# Patient Record
Sex: FEMALE | Race: AMERICAN INDIAN OR ALASKA NATIVE
[De-identification: names, ages, dates, MRNs, and addresses within clinical notes are randomized per-mention and may not be internally consistent; named-entity substitution may affect disease eponyms.]

---

## 2017-01-09 ENCOUNTER — HOSPITAL ENCOUNTER (OUTPATIENT)
Dept: HOSPITAL 5 - SPVWC | Age: 67
Discharge: HOME | End: 2017-01-09
Attending: OBSTETRICS & GYNECOLOGY
Payer: COMMERCIAL

## 2017-01-09 DIAGNOSIS — Z12.31: Primary | ICD-10-CM

## 2017-01-09 PROCEDURE — 77067 SCR MAMMO BI INCL CAD: CPT

## 2017-01-09 PROCEDURE — G0202 SCR MAMMO BI INCL CAD: HCPCS

## 2017-01-09 NOTE — MAMMOGRAPHY REPORT
BILATERAL DIGITAL SCREENING MAMMOGRAM : 01/09/17 08:56:00



CLINICAL: Routine screening.



COMPARISON:01/08/16



FINDINGS: The breasts are heterogeneously dense, which may obscure 

small masses.No mass, architectural distortion or suspicious 

calcifications.



IMPRESSION: No mammographic evidence of malignancy.



BI-RADS CATEGORY:  1 -- Negative



RECOMMENDATION: Routine mammographic screening in one year.





COMMENT:

Patient follow-up letters are generated by our [employer] application.

## 2017-06-20 ENCOUNTER — HOSPITAL ENCOUNTER (OUTPATIENT)
Dept: HOSPITAL 5 - SPVWC | Age: 67
Discharge: HOME | End: 2017-06-20
Attending: OBSTETRICS & GYNECOLOGY
Payer: COMMERCIAL

## 2017-06-20 DIAGNOSIS — N60.02: Primary | ICD-10-CM

## 2017-06-20 DIAGNOSIS — N64.4: ICD-10-CM

## 2017-06-20 PROCEDURE — G0204 DX MAMMO INCL CAD BI: HCPCS

## 2017-06-20 PROCEDURE — 76641 ULTRASOUND BREAST COMPLETE: CPT

## 2017-06-20 PROCEDURE — 77066 DX MAMMO INCL CAD BI: CPT

## 2017-06-20 NOTE — MAMMOGRAPHY REPORT
BILATERAL DIGITAL DIAGNOSTIC MAMMOGRAM with CAD and BILATERAL BREAST 

ULTRASOUND: 06/20/17 



CLINICAL: Bilateral breast pain.



COMPARISON:01/09/17



FINDINGS: The breasts are heterogeneously dense, which may obscure 

small masses.No mass, architectural distortion or suspicious 

calcifications. 



Ultrasound of the right breast (including all four quadrants and the 

retroareolar area)  demonstrated normal fibroglandular and fatty 

structures.  No mass, cyst or shadowing.



Ultrasound of the left breast (including all four quadrants and the 

retroareolar area)  demonstrated a single benign nontender cyst at 10 

o'clock 3 cm from the nipple measuring 3 x 3 x 2 mm.  No solid mass or 

shadowing.  Ultrasound of the left axilla demonstrated benign lymph 

nodes measuring 1.6 x 0.4 x 0.7 cm and 1.9 x 0.8 x 0.7 cm.



IMPRESSION: 1. Negative bilateral mammogram.  2.  Negative right breast 

ultrasound.  3.  A single 3 mm left breast cyst.  4. No explanation for 

bilateral breast pain.



BI-RADS CATEGORY:   2 -- Benign



RECOMMENDATION: Clinical followup and routine mammographic screening in 

one year.





COMMENT:

Patient follow-up letters are generated by our [employer] application.

## 2018-08-13 ENCOUNTER — HOSPITAL ENCOUNTER (OUTPATIENT)
Dept: HOSPITAL 5 - SPVWC | Age: 68
Discharge: HOME | End: 2018-08-13
Attending: OBSTETRICS & GYNECOLOGY
Payer: COMMERCIAL

## 2018-08-13 DIAGNOSIS — Z12.31: Primary | ICD-10-CM

## 2018-08-13 PROCEDURE — 77067 SCR MAMMO BI INCL CAD: CPT

## 2018-08-13 NOTE — MAMMOGRAPHY REPORT
BILATERAL DIGITAL SCREENING MAMMOGRAM with CAD: 08/13/18 10:01:00



CLINICAL: Routine screening.



COMPARISON:01/09/17



FINDINGS: The breasts are heterogeneously dense, which may obscure 

small masses. No mass, architectural distortion or suspicious 

calcifications.



IMPRESSION: No mammographic evidence of malignancy.



BI-RADS CATEGORY: 1 - - Negative



RECOMMENDATION: Routine mammographic screening in one year.





COMMENT:

Patient follow-up letters are generated by our [employer] application.

## 2021-05-13 ENCOUNTER — LAB OUTSIDE AN ENCOUNTER (OUTPATIENT)
Dept: URBAN - METROPOLITAN AREA CLINIC 118 | Facility: CLINIC | Age: 71
End: 2021-05-13

## 2021-05-13 ENCOUNTER — OFFICE VISIT (OUTPATIENT)
Dept: URBAN - METROPOLITAN AREA CLINIC 118 | Facility: CLINIC | Age: 71
End: 2021-05-13
Payer: COMMERCIAL

## 2021-05-13 VITALS
SYSTOLIC BLOOD PRESSURE: 153 MMHG | TEMPERATURE: 97.7 F | DIASTOLIC BLOOD PRESSURE: 85 MMHG | BODY MASS INDEX: 35.26 KG/M2 | HEART RATE: 88 BPM | WEIGHT: 179.6 LBS | HEIGHT: 60 IN

## 2021-05-13 DIAGNOSIS — K64.4 EXTERNAL HEMORRHOID: ICD-10-CM

## 2021-05-13 DIAGNOSIS — K59.09 CHRONIC CONSTIPATION: ICD-10-CM

## 2021-05-13 DIAGNOSIS — K62.5 RECTAL BLEEDING: ICD-10-CM

## 2021-05-13 DIAGNOSIS — Z86.010 PERSONAL HISTORY OF COLONIC POLYPS: ICD-10-CM

## 2021-05-13 PROBLEM — 236069009: Status: ACTIVE | Noted: 2021-05-13

## 2021-05-13 PROBLEM — 23913003: Status: ACTIVE | Noted: 2021-05-13

## 2021-05-13 PROBLEM — 305058001: Status: ACTIVE | Noted: 2021-05-13

## 2021-05-13 PROCEDURE — 99204 OFFICE O/P NEW MOD 45 MIN: CPT | Performed by: INTERNAL MEDICINE

## 2021-05-13 RX ORDER — AMLODIPINE BESYLATE 5 MG/1
1 TABLET TABLET ORAL ONCE A DAY
Status: ACTIVE | COMMUNITY

## 2021-05-13 RX ORDER — LOSARTAN POTASSIUM 50 MG/1
TABLET ORAL
Qty: 0 | Refills: 0 | Status: ACTIVE | COMMUNITY
Start: 2017-08-29

## 2021-05-13 NOTE — HPI-TODAY'S VISIT:
72 yo F presents for rectal bleeding and screening colonoscopy. Last colonoscopy 8/2020 with very poor prep, recommended to repeat this year. Pt will bring records. She reports onset of blood last month. 1 episode of red blood on toilet paper only. BMs varying b/w QD or 2x/wk w/occ hard stool, no straining. She reports bloating, lower abd pain that is relieved with BMs. This is her regular pattern. Denies N/V, fever, wt loss, diarrhea, changes in bowel habits. Personal hx of colon polyps. No FH of colon cancer.

## 2021-05-15 PROBLEM — 12063002: Status: ACTIVE | Noted: 2021-05-13

## 2021-06-21 ENCOUNTER — CLAIMS CREATED FROM THE CLAIM WINDOW (OUTPATIENT)
Dept: URBAN - METROPOLITAN AREA CLINIC 4 | Facility: CLINIC | Age: 71
End: 2021-06-21
Payer: COMMERCIAL

## 2021-06-21 ENCOUNTER — OFFICE VISIT (OUTPATIENT)
Dept: URBAN - METROPOLITAN AREA SURGERY CENTER 23 | Facility: SURGERY CENTER | Age: 71
End: 2021-06-21
Payer: COMMERCIAL

## 2021-06-21 DIAGNOSIS — D12.3 BENIGN NEOPLASM OF TRANSVERSE COLON: ICD-10-CM

## 2021-06-21 DIAGNOSIS — Z86.010 H/O ADENOMATOUS POLYP OF COLON: ICD-10-CM

## 2021-06-21 DIAGNOSIS — D12.3 ADENOMA OF TRANSVERSE COLON: ICD-10-CM

## 2021-06-21 PROCEDURE — 88305 TISSUE EXAM BY PATHOLOGIST: CPT | Performed by: PATHOLOGY

## 2021-06-21 PROCEDURE — G8907 PT DOC NO EVENTS ON DISCHARG: HCPCS | Performed by: INTERNAL MEDICINE

## 2021-06-21 PROCEDURE — 45385 COLONOSCOPY W/LESION REMOVAL: CPT | Performed by: INTERNAL MEDICINE

## 2021-06-21 RX ORDER — AMLODIPINE BESYLATE 5 MG/1
1 TABLET TABLET ORAL ONCE A DAY
Status: ACTIVE | COMMUNITY

## 2021-06-21 RX ORDER — LOSARTAN POTASSIUM 50 MG/1
TABLET ORAL
Qty: 0 | Refills: 0 | Status: ACTIVE | COMMUNITY
Start: 2017-08-29

## 2021-08-24 ENCOUNTER — OFFICE VISIT (OUTPATIENT)
Dept: URBAN - METROPOLITAN AREA CLINIC 118 | Facility: CLINIC | Age: 71
End: 2021-08-24

## 2021-11-02 ENCOUNTER — OFFICE VISIT (OUTPATIENT)
Dept: URBAN - METROPOLITAN AREA CLINIC 118 | Facility: CLINIC | Age: 71
End: 2021-11-02

## 2022-05-27 ENCOUNTER — OFFICE VISIT (OUTPATIENT)
Dept: URBAN - METROPOLITAN AREA CLINIC 118 | Facility: CLINIC | Age: 72
End: 2022-05-27
Payer: COMMERCIAL

## 2022-05-27 ENCOUNTER — DASHBOARD ENCOUNTERS (OUTPATIENT)
Age: 72
End: 2022-05-27

## 2022-05-27 DIAGNOSIS — R93.5 ABNORMAL CT OF THE ABDOMEN: ICD-10-CM

## 2022-05-27 DIAGNOSIS — Z86.010 PERSONAL HISTORY OF COLONIC POLYPS: ICD-10-CM

## 2022-05-27 PROBLEM — 428283002: Status: ACTIVE | Noted: 2021-05-13

## 2022-05-27 PROBLEM — 15634181000119107: Status: ACTIVE | Noted: 2022-05-27

## 2022-05-27 PROCEDURE — 99213 OFFICE O/P EST LOW 20 MIN: CPT | Performed by: INTERNAL MEDICINE

## 2022-05-27 RX ORDER — AMLODIPINE BESYLATE 5 MG/1
1 TABLET TABLET ORAL ONCE A DAY
Status: ACTIVE | COMMUNITY

## 2022-05-27 RX ORDER — LOSARTAN POTASSIUM 50 MG/1
TABLET ORAL
Qty: 0 | Refills: 0 | Status: ACTIVE | COMMUNITY
Start: 2017-08-29

## 2022-05-27 NOTE — HPI-TODAY'S VISIT:
73 yo BF here for evaluation of gallstones.  Pt apparently went for a [facility] screening with CT, and was then sent to PCP.  PCP, then at [facility], then did another CT, and was told she had "gallstones on your liver." No abd pain, N/V, weight loss.  No F/C/NS.   No hx of liver disease.

## 2023-02-10 NOTE — ULTRASOUND REPORT
ECG 12 Lead    Date/Time: 2/10/2023 10:36 AM  Performed by: Frank Madison MD  Authorized by: Frank Madison MD   Comparison: not compared with previous ECG   Rhythm: sinus bradycardia  Rate: bradycardic  Conduction: conduction normal  ST Depression: II, III and aVF  T Waves: T waves normal  QRS axis: normal  Other: no other findings    Clinical impression: abnormal EKG  Comments: EKG Interpretation Report    Heart rate:    66 beats/min, GA interval:  145 msec, QRS duration:  99 msec  QTu:399 msec, QTc:  413 msec             BILATERAL DIGITAL DIAGNOSTIC MAMMOGRAM with CAD and BILATERAL BREAST 

ULTRASOUND: 06/20/17 



CLINICAL: Bilateral breast pain.



COMPARISON:01/09/17



FINDINGS: The breasts are heterogeneously dense, which may obscure 

small masses.No mass, architectural distortion or suspicious 

calcifications. 



Ultrasound of the right breast (including all four quadrants and the 

retroareolar area)  demonstrated normal fibroglandular and fatty 

structures.  No mass, cyst or shadowing.



Ultrasound of the left breast (including all four quadrants and the 

retroareolar area)  demonstrated a single benign nontender cyst at 10 

o'clock 3 cm from the nipple measuring 3 x 3 x 2 mm.  No solid mass or 

shadowing.  Ultrasound of the left axilla demonstrated benign lymph 

nodes measuring 1.6 x 0.4 x 0.7 cm and 1.9 x 0.8 x 0.7 cm.



IMPRESSION: 1. Negative bilateral mammogram.  2.  Negative right breast 

ultrasound.  3.  A single 3 mm left breast cyst.  4. No explanation for 

bilateral breast pain.



BI-RADS CATEGORY:   2 -- Benign



RECOMMENDATION: Clinical followup and routine mammographic screening in 

one year.





COMMENT:

Patient follow-up letters are generated by our [employer] application.

## 2024-10-17 ENCOUNTER — LAB OUTSIDE AN ENCOUNTER (OUTPATIENT)
Dept: URBAN - METROPOLITAN AREA SURGERY CENTER 23 | Facility: SURGERY CENTER | Age: 74
End: 2024-10-17

## 2024-10-17 ENCOUNTER — TELEPHONE ENCOUNTER (OUTPATIENT)
Dept: URBAN - METROPOLITAN AREA CLINIC 118 | Facility: CLINIC | Age: 74
End: 2024-10-17

## 2024-10-21 ENCOUNTER — CLAIMS CREATED FROM THE CLAIM WINDOW (OUTPATIENT)
Dept: URBAN - METROPOLITAN AREA SURGERY CENTER 23 | Facility: SURGERY CENTER | Age: 74
End: 2024-10-21
Payer: COMMERCIAL

## 2024-10-21 DIAGNOSIS — Z09 ENCNTR FOR F/U EXAM AFT TRTMT FOR COND OTH THAN MALIG NEOPLM: ICD-10-CM

## 2024-10-21 DIAGNOSIS — Z86.0101 PERSONAL HISTORY OF ADENOMATOUS AND SERRATED COLON POLYPS: ICD-10-CM

## 2024-10-21 DIAGNOSIS — Z86.0100 PERSONAL HISTORY OF COLONIC POLYPS: ICD-10-CM

## 2024-10-21 DIAGNOSIS — K57.30 DIVERTICULA OF COLON: ICD-10-CM

## 2024-10-21 DIAGNOSIS — Z09 ENCOUNTER FOR FOLLOW-UP EXAMINATION AFTER COMPLETED TREATMENT FOR CONDITIONS OTHER THAN MALIGNANT NEOPLASM: ICD-10-CM

## 2024-10-21 PROCEDURE — 0529F INTRVL 3/>YR PTS CLNSCP DOCD: CPT | Performed by: INTERNAL MEDICINE

## 2024-10-21 PROCEDURE — G0105 COLORECTAL SCRN; HI RISK IND: HCPCS | Performed by: INTERNAL MEDICINE

## 2024-10-21 PROCEDURE — 00812 ANES LWR INTST SCR COLSC: CPT | Performed by: NURSE ANESTHETIST, CERTIFIED REGISTERED

## 2024-10-21 RX ORDER — AMLODIPINE BESYLATE 5 MG/1
1 TABLET TABLET ORAL ONCE A DAY
Status: ACTIVE | COMMUNITY

## 2024-10-21 RX ORDER — LOSARTAN POTASSIUM 50 MG/1
TABLET ORAL
Qty: 0 | Refills: 0 | Status: ACTIVE | COMMUNITY
Start: 2017-08-29